# Patient Record
Sex: MALE | Race: WHITE | NOT HISPANIC OR LATINO | Employment: FULL TIME | URBAN - METROPOLITAN AREA
[De-identification: names, ages, dates, MRNs, and addresses within clinical notes are randomized per-mention and may not be internally consistent; named-entity substitution may affect disease eponyms.]

---
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
## 2024-09-29 ENCOUNTER — OFFICE VISIT (OUTPATIENT)
Dept: URGENT CARE | Facility: URGENT CARE | Age: 24
End: 2024-09-29

## 2024-09-29 VITALS
WEIGHT: 165.2 LBS | SYSTOLIC BLOOD PRESSURE: 135 MMHG | HEART RATE: 107 BPM | RESPIRATION RATE: 18 BRPM | DIASTOLIC BLOOD PRESSURE: 85 MMHG | OXYGEN SATURATION: 99 % | TEMPERATURE: 100.2 F

## 2024-09-29 DIAGNOSIS — H66.001 ACUTE SUPPURATIVE OTITIS MEDIA OF RIGHT EAR WITHOUT SPONTANEOUS RUPTURE OF TYMPANIC MEMBRANE, RECURRENCE NOT SPECIFIED: Primary | ICD-10-CM

## 2024-09-29 DIAGNOSIS — H10.9 CONJUNCTIVITIS OF BOTH EYES, UNSPECIFIED CONJUNCTIVITIS TYPE: ICD-10-CM

## 2024-09-29 DIAGNOSIS — Z20.822 SUSPECTED 2019 NOVEL CORONAVIRUS INFECTION: ICD-10-CM

## 2024-09-29 DIAGNOSIS — R68.89 FLU-LIKE SYMPTOMS: ICD-10-CM

## 2024-09-29 LAB
FLUAV AG SPEC QL IA: NEGATIVE
FLUBV AG SPEC QL IA: NEGATIVE

## 2024-09-29 PROCEDURE — 87635 SARS-COV-2 COVID-19 AMP PRB: CPT | Performed by: FAMILY MEDICINE

## 2024-09-29 PROCEDURE — 87804 INFLUENZA ASSAY W/OPTIC: CPT | Performed by: FAMILY MEDICINE

## 2024-09-29 PROCEDURE — 99204 OFFICE O/P NEW MOD 45 MIN: CPT | Performed by: FAMILY MEDICINE

## 2024-09-29 RX ORDER — POLYMYXIN B SULFATE AND TRIMETHOPRIM 1; 10000 MG/ML; [USP'U]/ML
1-2 SOLUTION OPHTHALMIC EVERY 4 HOURS
Qty: 10 ML | Refills: 0 | Status: SHIPPED | OUTPATIENT
Start: 2024-09-29

## 2024-09-30 LAB — SARS-COV-2 RNA RESP QL NAA+PROBE: NEGATIVE

## 2024-09-30 NOTE — PROGRESS NOTES
Assessment & Plan     Acute suppurative otitis media of right ear without spontaneous rupture of tympanic membrane, recurrence not specified  Differentials discussed in detail.  Symptoms likely secondary to URI with superimposed right-sided otitis media and conjunctivitis.  Augmentin and Polytrim ophthalmic drops prescribed.  Recommended well hydration, warm fluids, over-the-counter analgesia and to go to ER if symptoms worsen otherwise follow-up with PCP early next week.  Patient, father understood and in agreement with above plan.  All questions answered.  - amoxicillin-clavulanate (AUGMENTIN) 875-125 MG tablet; Take 1 tablet by mouth 2 times daily for 10 days.    Conjunctivitis of both eyes, unspecified conjunctivitis type  - polymixin b-trimethoprim (POLYTRIM) 56675-5.1 UNIT/ML-% ophthalmic solution; Place 1-2 drops into both eyes every 4 hours.    Suspected 2019 novel coronavirus infection  - Symptomatic COVID-19 Virus (Coronavirus) by PCR Nose    Flu-like symptoms  - Influenza A & B Antigen - Clinic Collect      Subjective   Aamir is a 23 year old, presenting for the following health issues:  Otalgia (Right ear pain started an hour ago. Also has a fever. )    HPI   Concern -   Onset: 1 hour   Description: right ear pain started 1 hour ago, having sore throat, cough and congestion for 3 days   Intensity: moderate  Progression of Symptoms:  worsening  Accompanying Signs & Symptoms: fever  Previous history of similar problem:   Therapies tried and outcome: ibuprofen, earnest pill       Review of Systems  Constitutional, neuro, ENT, endocrine, pulmonary, cardiac, gastrointestinal, genitourinary, musculoskeletal, integument and psychiatric systems are negative, except as otherwise noted.      Objective    /85 (BP Location: Left arm, Cuff Size: Adult Regular)   Pulse 107   Temp 100.2  F (37.9  C) (Tympanic)   Resp 18   Wt 74.9 kg (165 lb 3.2 oz)   SpO2 99%   There is no height or weight on file to  calculate BMI.  Physical Exam   GENERAL: alert and in distress  EYES: PERRL, EOMI, and conjunctiva/corneas- green colored discharge present bilateral  HENT: normal cephalic/atraumatic, right ear: erythematous, bulging membrane, and mucopurulent effusion, left ear: normal: no effusions, no erythema, normal landmarks, oral mucous membranes moist, and oropharxnx crowded  NECK: no adenopathy, no asymmetry, masses, or scars  RESP: lungs clear to auscultation - no rales, rhonchi or wheezes  CV: tachycardia, normal S1 S2, no S3 or S4, and no murmur, click or rub  MS: no gross musculoskeletal defects noted, no edema  NEURO: Normal strength and tone, mentation intact and speech normal  PSYCH: mentation appears normal, affect normal/bright          Signed Electronically by: Carlos Herrera MD